# Patient Record
Sex: MALE | URBAN - METROPOLITAN AREA
[De-identification: names, ages, dates, MRNs, and addresses within clinical notes are randomized per-mention and may not be internally consistent; named-entity substitution may affect disease eponyms.]

---

## 2022-06-13 ENCOUNTER — APPOINTMENT (RX ONLY)
Dept: URBAN - METROPOLITAN AREA CLINIC 99 | Facility: CLINIC | Age: 27
Setting detail: DERMATOLOGY
End: 2022-06-13

## 2022-06-13 DIAGNOSIS — B36.0 PITYRIASIS VERSICOLOR: ICD-10-CM

## 2022-06-13 PROCEDURE — ? PRESCRIPTION

## 2022-06-13 PROCEDURE — ? KOH PREP

## 2022-06-13 PROCEDURE — ? COUNSELING

## 2022-06-13 PROCEDURE — ? TREATMENT REGIMEN

## 2022-06-13 PROCEDURE — ? ADDITIONAL NOTES

## 2022-06-13 PROCEDURE — 99203 OFFICE O/P NEW LOW 30 MIN: CPT

## 2022-06-13 RX ORDER — ECONAZOLE NITRATE 10 MG/G
CREAM TOPICAL
Qty: 85 | Refills: 0 | Status: ERX | COMMUNITY
Start: 2022-06-13

## 2022-06-13 RX ORDER — FLUCONAZOLE 150 MG/1
TABLET ORAL
Qty: 6 | Refills: 0 | Status: ERX | COMMUNITY
Start: 2022-06-13

## 2022-06-13 RX ADMIN — FLUCONAZOLE: 150 TABLET ORAL at 00:00

## 2022-06-13 RX ADMIN — ECONAZOLE NITRATE: 10 CREAM TOPICAL at 00:00

## 2022-06-13 ASSESSMENT — LOCATION SIMPLE DESCRIPTION DERM
LOCATION SIMPLE: ABDOMEN
LOCATION SIMPLE: RIGHT FOREARM
LOCATION SIMPLE: LEFT FOREARM
LOCATION SIMPLE: LOWER BACK

## 2022-06-13 ASSESSMENT — LOCATION ZONE DERM
LOCATION ZONE: TRUNK
LOCATION ZONE: ARM

## 2022-06-13 ASSESSMENT — LOCATION DETAILED DESCRIPTION DERM
LOCATION DETAILED: RIGHT VENTRAL PROXIMAL FOREARM
LOCATION DETAILED: SUPERIOR LUMBAR SPINE
LOCATION DETAILED: LEFT VENTRAL PROXIMAL FOREARM
LOCATION DETAILED: PERIUMBILICAL SKIN
LOCATION DETAILED: RIGHT LATERAL ABDOMEN

## 2022-06-13 NOTE — PROCEDURE: ADDITIONAL NOTES
Additional Notes: If not resolved in 6 weeks, will return to clinic for biopsy
Detail Level: Simple
Render Risk Assessment In Note?: no

## 2022-06-13 NOTE — HPI: RASH
What Type Of Note Output Would You Prefer (Optional)?: Standard Output
Is The Patient Presenting As Previously Scheduled?: Yes
How Severe Is Your Rash?: moderate
Is This A New Presentation, Or A Follow-Up?: Rash
Additional History: Patient reports rash worsens with hot showers

## 2022-06-13 NOTE — PROCEDURE: TREATMENT REGIMEN
Stress on weight loss and calcium and vitamin D   Increase exercise to decrease morbidity    
Plan: Cooler showers, can apply head and shoulders shampoo to affected areas on trunk in shower.
Initiate Treatment: Diflucan 150 mg tablet - Take one tablet by mouth once a week for 6 weeks.\\n\\neconazole 1 % topical cream - Apply topically to affected areas on trunk and upper extremities twice daily for up to 6 weeks.
Detail Level: Zone

## 2024-06-13 ENCOUNTER — APPOINTMENT (RX ONLY)
Dept: URBAN - METROPOLITAN AREA CLINIC 99 | Facility: CLINIC | Age: 29
Setting detail: DERMATOLOGY
End: 2024-06-13

## 2024-06-13 DIAGNOSIS — Z72.0 TOBACCO USE: ICD-10-CM

## 2024-06-13 DIAGNOSIS — L64.8 OTHER ANDROGENIC ALOPECIA: ICD-10-CM | Status: STABLE

## 2024-06-13 DIAGNOSIS — B36.0 PITYRIASIS VERSICOLOR: ICD-10-CM | Status: INADEQUATELY CONTROLLED

## 2024-06-13 DIAGNOSIS — D22 MELANOCYTIC NEVI: ICD-10-CM

## 2024-06-13 DIAGNOSIS — Z71.89 OTHER SPECIFIED COUNSELING: ICD-10-CM

## 2024-06-13 PROBLEM — D22.4 MELANOCYTIC NEVI OF SCALP AND NECK: Status: ACTIVE | Noted: 2024-06-13

## 2024-06-13 PROBLEM — D22.5 MELANOCYTIC NEVI OF TRUNK: Status: ACTIVE | Noted: 2024-06-13

## 2024-06-13 PROCEDURE — ? PRESCRIPTION

## 2024-06-13 PROCEDURE — ? ADDITIONAL NOTES

## 2024-06-13 PROCEDURE — ? SMOKING CESSATION COUNSELING

## 2024-06-13 PROCEDURE — 99213 OFFICE O/P EST LOW 20 MIN: CPT

## 2024-06-13 PROCEDURE — ? SUNSCREEN RECOMMENDATIONS

## 2024-06-13 PROCEDURE — ? TREATMENT REGIMEN

## 2024-06-13 PROCEDURE — ? COUNSELING

## 2024-06-13 RX ORDER — FLUCONAZOLE 150 MG/1
1 TABLET ORAL WEEKLY
Qty: 6 | Refills: 0 | Status: ERX | COMMUNITY
Start: 2024-06-13

## 2024-06-13 RX ORDER — ECONAZOLE NITRATE 10 MG/G
SMALL AMMT CREAM TOPICAL BID
Qty: 85 | Refills: 0 | Status: ERX | COMMUNITY
Start: 2024-06-13

## 2024-06-13 RX ORDER — MINOXIDIL 2.5 MG/1
1 TABLET ORAL DAILY
Qty: 90 | Refills: 0 | Status: ERX | COMMUNITY
Start: 2024-06-13

## 2024-06-13 RX ADMIN — ECONAZOLE NITRATE SMALL AMMT: 10 CREAM TOPICAL at 00:00

## 2024-06-13 RX ADMIN — MINOXIDIL 1: 2.5 TABLET ORAL at 00:00

## 2024-06-13 RX ADMIN — FLUCONAZOLE 1: 150 TABLET ORAL at 00:00

## 2024-06-13 ASSESSMENT — LOCATION DETAILED DESCRIPTION DERM
LOCATION DETAILED: LEFT MEDIAL SUPERIOR CHEST
LOCATION DETAILED: RIGHT INFERIOR PARIETAL SCALP
LOCATION DETAILED: RIGHT SUPERIOR UPPER BACK
LOCATION DETAILED: MID POSTERIOR NECK
LOCATION DETAILED: POSTERIOR MID-PARIETAL SCALP
LOCATION DETAILED: MID-OCCIPITAL SCALP
LOCATION DETAILED: LEFT SUPERIOR LATERAL UPPER BACK

## 2024-06-13 ASSESSMENT — LOCATION ZONE DERM
LOCATION ZONE: SCALP
LOCATION ZONE: TRUNK
LOCATION ZONE: NECK

## 2024-06-13 ASSESSMENT — LOCATION SIMPLE DESCRIPTION DERM
LOCATION SIMPLE: SCALP
LOCATION SIMPLE: RIGHT UPPER BACK
LOCATION SIMPLE: POSTERIOR NECK
LOCATION SIMPLE: POSTERIOR SCALP
LOCATION SIMPLE: LEFT UPPER BACK
LOCATION SIMPLE: CHEST

## 2024-06-13 NOTE — PROCEDURE: ADDITIONAL NOTES
Additional Notes: Discussed the risks and benefits of topical Minoxidil, oral Minoxidil, topical Finasteride, and oral Finasteride. Pt would like to try oral minoxidil.

## 2024-06-13 NOTE — PROCEDURE: TREATMENT REGIMEN
Plan: Cooler showers, can apply head and shoulders shampoo to affected areas on trunk in shower.
Initiate Treatment: Diflucan 150 mg tablet - Take one tablet by mouth once a week for 6 weeks.\\n\\neconazole 1 % topical cream - Apply topically to affected areas on trunk and upper extremities twice daily for up to 6 weeks.
Detail Level: Zone
Initiate Treatment: Minoxidil

## 2025-05-19 ENCOUNTER — APPOINTMENT (OUTPATIENT)
Dept: URBAN - METROPOLITAN AREA CLINIC 99 | Facility: CLINIC | Age: 30
Setting detail: DERMATOLOGY
End: 2025-05-19

## 2025-05-19 DIAGNOSIS — Z71.89 OTHER SPECIFIED COUNSELING: ICD-10-CM

## 2025-05-19 DIAGNOSIS — B36.0 PITYRIASIS VERSICOLOR: ICD-10-CM

## 2025-05-19 PROCEDURE — ? TREATMENT REGIMEN

## 2025-05-19 PROCEDURE — ? ADDITIONAL NOTES

## 2025-05-19 PROCEDURE — ? SUNSCREEN RECOMMENDATIONS

## 2025-05-19 PROCEDURE — ? PRESCRIPTION

## 2025-05-19 PROCEDURE — ? COUNSELING

## 2025-05-19 PROCEDURE — 99213 OFFICE O/P EST LOW 20 MIN: CPT

## 2025-05-19 RX ORDER — FLUCONAZOLE 150 MG/1
TABLET ORAL WEEKLY
Qty: 6 | Refills: 0 | Status: ERX | COMMUNITY
Start: 2025-05-19

## 2025-05-19 RX ADMIN — FLUCONAZOLE: 150 TABLET ORAL at 00:00

## 2025-05-19 ASSESSMENT — LOCATION SIMPLE DESCRIPTION DERM
LOCATION SIMPLE: POSTERIOR NECK
LOCATION SIMPLE: LEFT UPPER BACK
LOCATION SIMPLE: RIGHT UPPER BACK

## 2025-05-19 ASSESSMENT — LOCATION DETAILED DESCRIPTION DERM
LOCATION DETAILED: MID POSTERIOR NECK
LOCATION DETAILED: RIGHT SUPERIOR UPPER BACK
LOCATION DETAILED: LEFT SUPERIOR LATERAL UPPER BACK

## 2025-05-19 ASSESSMENT — LOCATION ZONE DERM
LOCATION ZONE: TRUNK
LOCATION ZONE: NECK

## 2025-05-19 NOTE — PROCEDURE: ADDITIONAL NOTES
Additional Notes: Pt declines econazole cream as he states it previously made him itchy.
Render Risk Assessment In Note?: no
Detail Level: Simple

## 2025-05-19 NOTE — PROCEDURE: TREATMENT REGIMEN
Plan: Cooler showers, can apply head and shoulders shampoo to affected areas on trunk in shower.
Initiate Treatment: fluconazole 150 mg tablet Weekly: Take one tablet by mouth once a week for 6 weeks.
Detail Level: Zone